# Patient Record
Sex: MALE | Race: WHITE | NOT HISPANIC OR LATINO | URBAN - METROPOLITAN AREA
[De-identification: names, ages, dates, MRNs, and addresses within clinical notes are randomized per-mention and may not be internally consistent; named-entity substitution may affect disease eponyms.]

---

## 2017-02-22 ENCOUNTER — OFFICE (OUTPATIENT)
Dept: URBAN - METROPOLITAN AREA CLINIC 78 | Facility: CLINIC | Age: 72
End: 2017-02-22

## 2017-02-22 VITALS
HEART RATE: 83 BPM | SYSTOLIC BLOOD PRESSURE: 156 MMHG | DIASTOLIC BLOOD PRESSURE: 100 MMHG | HEIGHT: 66 IN | TEMPERATURE: 98 F | WEIGHT: 179 LBS

## 2017-02-22 DIAGNOSIS — K52.89 OTHER SPECIFIED NONINFECTIVE GASTROENTERITIS AND COLITIS: ICD-10-CM

## 2017-02-22 DIAGNOSIS — Z79.899 OTHER LONG TERM (CURRENT) DRUG THERAPY: ICD-10-CM

## 2017-02-22 PROCEDURE — 99213 OFFICE O/P EST LOW 20 MIN: CPT

## 2017-02-22 RX ORDER — MESALAMINE 800 MG/1
TABLET, DELAYED RELEASE ORAL
Qty: 360 | Refills: 3 | Status: COMPLETED
Start: 2016-01-08 | End: 2018-03-28

## 2017-02-22 NOTE — SERVICEHPINOTES
70 yo white male presents for f/u colitis and refill of his mesalamine - taking Asacol  mg 2 PO BID. Patient has h/o indeterminate colitis diagnosed in 2000. States he originally had diarrhea issues but no blood in stools. Colonoscopy in 2000 showed erosions throughout but worse in right colon. He was put on sulfasalazine and switched at some point to Asacol which he has done very well with. Colonoscopy in 2008 showed appearance of colitis in the descending colon but all biopsies were wnl. Last colonoscopy with benign findings in Dec 2012 and 5-year recall advised. No GI sx. Denies any abdominal pain, diarrhea, weight loss, blood in stools. Has one normal formed BM per day.

## 2018-02-12 ENCOUNTER — INDEPENDENT LABORATORY (OUTPATIENT)
Dept: URBAN - METROPOLITAN AREA PATHOLOGY 17 | Facility: PATHOLOGY | Age: 73
End: 2018-02-12

## 2018-02-12 ENCOUNTER — OFFICE (OUTPATIENT)
Dept: URBAN - METROPOLITAN AREA CLINIC 32 | Facility: CLINIC | Age: 73
End: 2018-02-12
Payer: COMMERCIAL

## 2018-02-12 VITALS
DIASTOLIC BLOOD PRESSURE: 64 MMHG | DIASTOLIC BLOOD PRESSURE: 63 MMHG | TEMPERATURE: 97.7 F | HEIGHT: 66 IN | RESPIRATION RATE: 12 BRPM | HEART RATE: 65 BPM | SYSTOLIC BLOOD PRESSURE: 108 MMHG | OXYGEN SATURATION: 93 % | OXYGEN SATURATION: 96 % | DIASTOLIC BLOOD PRESSURE: 66 MMHG | DIASTOLIC BLOOD PRESSURE: 83 MMHG | OXYGEN SATURATION: 97 % | HEART RATE: 58 BPM | SYSTOLIC BLOOD PRESSURE: 92 MMHG | OXYGEN SATURATION: 98 % | WEIGHT: 179 LBS | RESPIRATION RATE: 14 BRPM | DIASTOLIC BLOOD PRESSURE: 60 MMHG | HEART RATE: 60 BPM | SYSTOLIC BLOOD PRESSURE: 101 MMHG | HEART RATE: 62 BPM | SYSTOLIC BLOOD PRESSURE: 100 MMHG | SYSTOLIC BLOOD PRESSURE: 111 MMHG | OXYGEN SATURATION: 92 % | DIASTOLIC BLOOD PRESSURE: 74 MMHG | RESPIRATION RATE: 10 BRPM | SYSTOLIC BLOOD PRESSURE: 102 MMHG | DIASTOLIC BLOOD PRESSURE: 65 MMHG | SYSTOLIC BLOOD PRESSURE: 130 MMHG | TEMPERATURE: 97.2 F | SYSTOLIC BLOOD PRESSURE: 128 MMHG | DIASTOLIC BLOOD PRESSURE: 59 MMHG | HEART RATE: 76 BPM | HEART RATE: 73 BPM | HEART RATE: 63 BPM | RESPIRATION RATE: 16 BRPM

## 2018-02-12 DIAGNOSIS — K52.89 OTHER SPECIFIED NONINFECTIVE GASTROENTERITIS AND COLITIS: ICD-10-CM

## 2018-02-12 DIAGNOSIS — Z86.010 PERSONAL HISTORY OF COLONIC POLYPS: ICD-10-CM

## 2018-02-12 DIAGNOSIS — K51.818 OTHER ULCERATIVE COLITIS WITH OTHER COMPLICATION: ICD-10-CM

## 2018-02-12 PROBLEM — K64.4 RESIDUAL HEMORRHOIDAL SKIN TAGS: Status: ACTIVE | Noted: 2018-02-12

## 2018-02-12 PROBLEM — K64.8 OTHER HEMORRHOIDS: Status: ACTIVE | Noted: 2018-02-12

## 2018-02-12 PROCEDURE — 88305 TISSUE EXAM BY PATHOLOGIST: CPT

## 2018-02-12 RX ADMIN — LIDOCAINE HYDROCHLORIDE 60 MG: 20 INJECTION, SOLUTION INFILTRATION; PERINEURAL at 08:12

## 2018-03-28 ENCOUNTER — OFFICE (OUTPATIENT)
Dept: URBAN - METROPOLITAN AREA CLINIC 78 | Facility: CLINIC | Age: 73
End: 2018-03-28

## 2018-03-28 VITALS
TEMPERATURE: 97.6 F | WEIGHT: 178 LBS | HEART RATE: 70 BPM | SYSTOLIC BLOOD PRESSURE: 117 MMHG | HEIGHT: 65 IN | DIASTOLIC BLOOD PRESSURE: 81 MMHG

## 2018-03-28 DIAGNOSIS — Z79.899 OTHER LONG TERM (CURRENT) DRUG THERAPY: ICD-10-CM

## 2018-03-28 DIAGNOSIS — K52.89 OTHER SPECIFIED NONINFECTIVE GASTROENTERITIS AND COLITIS: ICD-10-CM

## 2018-03-28 PROCEDURE — 99214 OFFICE O/P EST MOD 30 MIN: CPT

## 2018-03-28 NOTE — INTERFACERESULTNOTES
missing TPMT enzyme level - still need that; other labs ok other than stool test showing some inflammation in colon.

## 2018-03-28 NOTE — SERVICEHPINOTES
73 yo white male presents for f/u colitis see clinical hx below. He continues to feel well on Asacol  mg 2 PO BID. His recent colonoscopy was normal except for patchy colitis in cecum and ascending colon. Pan-biopsies done -all ok except for severe colitis findings in cecum and ascending colon. He was to have gotten additional labs done prior to today's visit but states he didn't get the orders in the mail. He currently has one BM daily - stools start formed but can become loose. Denies blood in stools. No abdominal pain, fevers, weight loss, joint pains. Clinical hx: Patient has h/o indeterminate colitis diagnosed in 2000. States he originally had diarrhea issues but no blood in stools. Colonoscopy in 2000 showed erosions throughout but worse in right colon. He was put on sulfasalazine and switched at some point to Asacol which he has done very well with. Colonoscopy in 2008 showed appearance of colitis in the descending colon but all biopsies were wnl. In Dec 2012 his colonoscopy showed benign findings with 5-year recall advised.

## 2018-03-30 LAB
AMBIG ABBREV CMP14 DEFAULT: (no result)
C-REACTIVE PROTEIN, QUANT: 1.9 MG/L (ref 0–4.9)
CBC WITH DIFFERENTIAL/PLATELET: BASO (ABSOLUTE): 0 X10E3/UL (ref 0–0.2)
CBC WITH DIFFERENTIAL/PLATELET: BASOS: 1 %
CBC WITH DIFFERENTIAL/PLATELET: EOS (ABSOLUTE): 0.2 X10E3/UL (ref 0–0.4)
CBC WITH DIFFERENTIAL/PLATELET: EOS: 4 %
CBC WITH DIFFERENTIAL/PLATELET: HEMATOCRIT: 44.4 % (ref 37.5–51)
CBC WITH DIFFERENTIAL/PLATELET: HEMATOLOGY COMMENTS: (no result)
CBC WITH DIFFERENTIAL/PLATELET: HEMOGLOBIN: 14.6 G/DL (ref 13–17.7)
CBC WITH DIFFERENTIAL/PLATELET: IMMATURE CELLS: (no result)
CBC WITH DIFFERENTIAL/PLATELET: IMMATURE GRANS (ABS): 0 X10E3/UL (ref 0–0.1)
CBC WITH DIFFERENTIAL/PLATELET: IMMATURE GRANULOCYTES: 0 %
CBC WITH DIFFERENTIAL/PLATELET: LYMPHS (ABSOLUTE): 2 X10E3/UL (ref 0.7–3.1)
CBC WITH DIFFERENTIAL/PLATELET: LYMPHS: 32 %
CBC WITH DIFFERENTIAL/PLATELET: MCH: 30.7 PG (ref 26.6–33)
CBC WITH DIFFERENTIAL/PLATELET: MCHC: 32.9 G/DL (ref 31.5–35.7)
CBC WITH DIFFERENTIAL/PLATELET: MCV: 93 FL (ref 79–97)
CBC WITH DIFFERENTIAL/PLATELET: MONOCYTES(ABSOLUTE): 0.7 X10E3/UL (ref 0.1–0.9)
CBC WITH DIFFERENTIAL/PLATELET: MONOCYTES: 11 %
CBC WITH DIFFERENTIAL/PLATELET: NEUTROPHILS (ABSOLUTE): 3.3 X10E3/UL (ref 1.4–7)
CBC WITH DIFFERENTIAL/PLATELET: NEUTROPHILS: 52 %
CBC WITH DIFFERENTIAL/PLATELET: NRBC: (no result)
CBC WITH DIFFERENTIAL/PLATELET: PLATELETS: 238 X10E3/UL (ref 150–379)
CBC WITH DIFFERENTIAL/PLATELET: RBC: 4.76 X10E6/UL (ref 4.14–5.8)
CBC WITH DIFFERENTIAL/PLATELET: RDW: 13.7 % (ref 12.3–15.4)
CBC WITH DIFFERENTIAL/PLATELET: WBC: 6.3 X10E3/UL (ref 3.4–10.8)
COMP. METABOLIC PANEL (14): A/G RATIO: 2 (ref 1.2–2.2)
COMP. METABOLIC PANEL (14): ALBUMIN: 4.3 G/DL (ref 3.5–4.8)
COMP. METABOLIC PANEL (14): ALKALINE PHOSPHATASE: 63 IU/L (ref 39–117)
COMP. METABOLIC PANEL (14): ALT (SGPT): 31 IU/L (ref 0–44)
COMP. METABOLIC PANEL (14): AST (SGOT): 26 IU/L (ref 0–40)
COMP. METABOLIC PANEL (14): BILIRUBIN, TOTAL: 0.4 MG/DL (ref 0–1.2)
COMP. METABOLIC PANEL (14): BUN/CREATININE RATIO: 20 (ref 10–24)
COMP. METABOLIC PANEL (14): BUN: 17 MG/DL (ref 8–27)
COMP. METABOLIC PANEL (14): CALCIUM: 9.6 MG/DL (ref 8.6–10.2)
COMP. METABOLIC PANEL (14): CARBON DIOXIDE, TOTAL: 25 MMOL/L (ref 18–29)
COMP. METABOLIC PANEL (14): CHLORIDE: 104 MMOL/L (ref 96–106)
COMP. METABOLIC PANEL (14): CREATININE: 0.87 MG/DL (ref 0.76–1.27)
COMP. METABOLIC PANEL (14): EGFR IF AFRICN AM: 100 ML/MIN/1.73 (ref 59–?)
COMP. METABOLIC PANEL (14): EGFR IF NONAFRICN AM: 87 ML/MIN/1.73 (ref 59–?)
COMP. METABOLIC PANEL (14): GLOBULIN, TOTAL: 2.2 G/DL (ref 1.5–4.5)
COMP. METABOLIC PANEL (14): GLUCOSE: 92 MG/DL (ref 65–99)
COMP. METABOLIC PANEL (14): POTASSIUM: 4.7 MMOL/L (ref 3.5–5.2)
COMP. METABOLIC PANEL (14): PROTEIN, TOTAL: 6.5 G/DL (ref 6–8.5)
COMP. METABOLIC PANEL (14): SODIUM: 143 MMOL/L (ref 134–144)
FERRITIN, SERUM: 259 NG/ML (ref 30–400)
HBSAG SCREEN: NEGATIVE
HCV ANTIBODY: HEP C VIRUS AB: <0.1 S/CO RATIO
HEP A AB, TOTAL: POSITIVE
HEP B CORE AB, TOT: NEGATIVE
HEPATITIS B SURF AB QUANT: <3.1 MIU/ML — LOW
IRON AND TIBC: IRON BIND.CAP.(TIBC): 281 UG/DL (ref 250–450)
IRON AND TIBC: IRON SATURATION: 38 % (ref 15–55)
IRON AND TIBC: IRON: 107 UG/DL (ref 38–169)
IRON AND TIBC: UIBC: 174 UG/DL (ref 111–343)
QUANTIFERON IN TUBE: INTERPRETATION: (no result)
QUANTIFERON IN TUBE: QFT TB AG MINUS NIL VALUE: 0 IU/ML
QUANTIFERON IN TUBE: QUANTIFERON CRITERIA: (no result)
QUANTIFERON IN TUBE: QUANTIFERON MITOGEN VALUE: 6.37 IU/ML
QUANTIFERON IN TUBE: QUANTIFERON NIL VALUE: 0.04 IU/ML
QUANTIFERON IN TUBE: QUANTIFERON TB AG VALUE: 0.04 IU/ML
QUANTIFERON IN TUBE: QUANTIFERON TB GOLD: NEGATIVE
QUANTIFERON TB GOLD (IN TUBE): QUANTIFERON INCUBATION: (no result)
SEDIMENTATION RATE-WESTERGREN: 2 MM/HR (ref 0–30)
VITAMIN B12: 857 PG/ML (ref 232–1245)
VITAMIN D, 25-HYDROXY: 45.9 NG/ML (ref 30–100)

## 2018-03-31 LAB — CALPROTECTIN, FECAL: 175 UG/G — HIGH (ref 0–120)

## 2018-04-04 LAB
TPMT GENETIC TEST: BACKGROUND: (no result)
TPMT GENETIC TEST: TPMT COMMENT: (no result)
TPMT GENETIC TEST: TPMT GENOTYPE: (no result)
TPMT GENETIC TEST: TPMT INTERPRETATION: (no result)
WRITTEN AUTHORIZATION: (no result)

## 2018-05-02 ENCOUNTER — OFFICE (OUTPATIENT)
Dept: URBAN - METROPOLITAN AREA CLINIC 78 | Facility: CLINIC | Age: 73
End: 2018-05-02

## 2018-05-02 VITALS
WEIGHT: 175 LBS | TEMPERATURE: 97.8 F | HEART RATE: 85 BPM | DIASTOLIC BLOOD PRESSURE: 73 MMHG | SYSTOLIC BLOOD PRESSURE: 129 MMHG | HEIGHT: 65 IN

## 2018-05-02 DIAGNOSIS — Z79.899 OTHER LONG TERM (CURRENT) DRUG THERAPY: ICD-10-CM

## 2018-05-02 DIAGNOSIS — K52.89 OTHER SPECIFIED NONINFECTIVE GASTROENTERITIS AND COLITIS: ICD-10-CM

## 2018-05-02 PROCEDURE — 99214 OFFICE O/P EST MOD 30 MIN: CPT

## 2018-05-02 RX ORDER — MESALAMINE 800 MG/1
TABLET, DELAYED RELEASE ORAL
Qty: 3 | Refills: 4 | Status: COMPLETED
End: 2020-05-12

## 2018-05-02 NOTE — SERVICEHPINOTES
73 yo white male presents for f/u colitis see clinical hx below. He continues to feel well on Asacol  mg 2 PO BID. His recent colonoscopy was normal except for patchy colitis in cecum and ascending colon. Pan-biopsies done -all ok except for severe colitis findings in cecum and ascending colon. He had f/u labs which were normal but fecal calpro elevated at 175. He currently has one BM daily - stools start formed but can become loose. Denies blood in stools. No abdominal pain, fevers, weight loss, joint pains. Clinical hx: Patient has h/o indeterminate colitis diagnosed in 2000. States he originally had diarrhea issues but no blood in stools. Colonoscopy in 2000 showed erosions throughout but worse in right colon. He was put on sulfasalazine and switched at some point to Asacol which he has done very well with. Colonoscopy in 2008 showed appearance of colitis in the descending colon but all biopsies were wnl. In Dec 2012 his colonoscopy showed benign findings with 5-year recall advised.3/30/18 WBC 6.3, iron sat 38, HBsAb neg, Vit D 45.9, Hep C ab neg, Quant TB neg, ESR 2, Vit B12 857, ferritin 259, HBsAg neg, CRP 1.9, HBcAb tot neg, Hep A ab pos/immune TPMT normal genotype fecal calpro 175

## 2018-10-12 ENCOUNTER — OFFICE (OUTPATIENT)
Dept: URBAN - METROPOLITAN AREA CLINIC 78 | Facility: CLINIC | Age: 73
End: 2018-10-12

## 2018-10-12 VITALS
DIASTOLIC BLOOD PRESSURE: 84 MMHG | WEIGHT: 177 LBS | HEIGHT: 65 IN | SYSTOLIC BLOOD PRESSURE: 137 MMHG | TEMPERATURE: 97.5 F | HEART RATE: 69 BPM

## 2018-10-12 DIAGNOSIS — K52.89 OTHER SPECIFIED NONINFECTIVE GASTROENTERITIS AND COLITIS: ICD-10-CM

## 2018-10-12 DIAGNOSIS — Z79.899 OTHER LONG TERM (CURRENT) DRUG THERAPY: ICD-10-CM

## 2018-10-12 PROCEDURE — 99213 OFFICE O/P EST LOW 20 MIN: CPT

## 2018-10-12 NOTE — SERVICEHPINOTES
73 yo white male presents for f/u colitis see clinical hx below. He had previously been on Asacol  mg 2 PO BID. Colonoscopy earlier this year was normal except for patchy colitis in cecum and ascending colon. Pan-biopsies done -all ok except for severe colitis findings in cecum and ascending colon. He had f/u labs which were normal but fecal calpro elevated at 175. In May he increased Asacol HD to 3 PO BID and also started turmeric and VSL #3 and l-glutamine. Has been doing well. He had a f/u fecal calpro level done recently and it improved to 93. Has a daily BM. Denies blood in stools. No abdominal pain, fevers, weight loss, joint pains. Clinical hx: Patient has h/o indeterminate colitis diagnosed in 2000. States he originally had diarrhea issues but no blood in stools. Colonoscopy in 2000 showed erosions throughout but worse in right colon. He was put on sulfasalazine and switched at some point to Asacol which he has done very well with. Colonoscopy in 2008 showed appearance of colitis in the descending colon but all biopsies were wnl. In Dec 2012 his colonoscopy showed benign findings with 5-year recall advised.8/27/18 creat 0.87, AST/ALT 19/20, fecal calpro 93BR3/30/18 WBC 6.3, iron sat 38, HBsAb neg, Vit D 45.9, Hep C ab neg, Quant TB neg, ESR 2, Vit B12 857, ferritin 259, HBsAg neg, CRP 1.9, HBcAb tot neg, Hep A ab pos/immune TPMT normal genotype fecal calpro 175

## 2019-02-27 ENCOUNTER — OFFICE (OUTPATIENT)
Dept: URBAN - METROPOLITAN AREA CLINIC 78 | Facility: CLINIC | Age: 74
End: 2019-02-27

## 2019-02-27 VITALS
WEIGHT: 178 LBS | DIASTOLIC BLOOD PRESSURE: 84 MMHG | TEMPERATURE: 97.4 F | HEIGHT: 65 IN | SYSTOLIC BLOOD PRESSURE: 144 MMHG | HEART RATE: 75 BPM

## 2019-02-27 DIAGNOSIS — K52.89 OTHER SPECIFIED NONINFECTIVE GASTROENTERITIS AND COLITIS: ICD-10-CM

## 2019-02-27 DIAGNOSIS — Z79.899 OTHER LONG TERM (CURRENT) DRUG THERAPY: ICD-10-CM

## 2019-02-27 PROCEDURE — 99213 OFFICE O/P EST LOW 20 MIN: CPT

## 2019-02-27 NOTE — SERVICEHPINOTES
72 yo white male presents for f/u colitis see clinical hx below. He had previously been on Asacol  mg 2 PO BID. Colonoscopy earlier 2018 was normal except for patchy colitis in cecum and ascending colon. Pan-biopsies done -all ok except for severe colitis findings in cecum and ascending colon. He had f/u labs which were normal but fecal calpro elevated at 175. In May he increased Asacol HD to 3 PO BID and also started turmeric and probiotics and l-glutamine. He had a f/u fecal calpro level done in August - improved at 93. He continued his regimen and his latest fecal calpro was &lt16. He continues to feel well from GI perspective. He has a daily BM. Denies blood in stools. No abdominal pain, fevers, weight loss, joint pains. Clinical hx: Patient has h/o indeterminate colitis diagnosed in 2000. States he originally had diarrhea issues but no blood in stools. Colonoscopy in 2000 showed erosions throughout but worse in right colon. He was put on sulfasalazine and switched at some point to Asacol which he has done very well with. Colonoscopy in 2008 showed appearance of colitis in the descending colon but all biopsies were wnl. In Dec 2012 his colonoscopy showed benign findings with 5-year recall advised.1/25/19 CMP unremarkable (non-fasting), fecal calpro &xk94IF6/27/18 creat 0.87, AST/ALT 19/20, fecal calpro 93BR3/30/18 WBC 6.3, iron sat 38, HBsAb neg, Vit D 45.9, Hep C ab neg, Quant TB neg, ESR 2, Vit B12 857, ferritin 259, HBsAg neg, CRP 1.9, HBcAb tot neg, Hep A ab pos/immune TPMT normal genotype fecal calpro 175

## 2020-05-12 ENCOUNTER — TELEHEALTH PROVIDED OTHER THAN IN PATIENT'S HOME (OUTPATIENT)
Dept: URBAN - METROPOLITAN AREA TELEHEALTH 7 | Facility: TELEHEALTH | Age: 75
End: 2020-05-12

## 2020-05-12 VITALS — WEIGHT: 162 LBS | HEIGHT: 65 IN

## 2020-05-12 DIAGNOSIS — K52.89 OTHER SPECIFIED NONINFECTIVE GASTROENTERITIS AND COLITIS: ICD-10-CM

## 2020-05-12 DIAGNOSIS — Z79.899 OTHER LONG TERM (CURRENT) DRUG THERAPY: ICD-10-CM

## 2020-05-12 PROCEDURE — 99214 OFFICE O/P EST MOD 30 MIN: CPT | Mod: 95 | Performed by: PHYSICIAN ASSISTANT

## 2020-05-12 RX ORDER — MESALAMINE 800 MG/1
TABLET, DELAYED RELEASE ORAL
Qty: 360 | Refills: 3 | Status: ACTIVE

## 2020-05-12 NOTE — SERVICEHPINOTES
PATIENT VERIFIED BY DATE OF BIRTH AND NAME. Patient has been consented for this telecommunication visit. 75 yo white male presents for f/u colitis see clinical hx below. He continues to feel well from a GI perspective. He is on mesalamine 800 mg 3 pills 2x/day along with ongoing probiotics, turmeric, and l-glutamine. He has intentionally lost a little weight recently. He has a daily BM. Denies blood in stools. No abdominal pain, fevers, weight loss, joint pains, rashes. Latest fecal calpro was done in January of this year and was still negative. ROS today is negative other than some sleep cycle change and depression which he states is related to his wife passing away in March. He will likely have his physical and routine blood work with his PCP in a few months. He otherwise had a CBC and CMP for us in January - unremarkable.Clinical hx: Patient has h/o indeterminate colitis diagnosed in 2000. States he originally had diarrhea issues but no blood in stools. Colonoscopy in 2000 showed erosions throughout but worse in right colon. He was put on sulfasalazine and switched at some point to Asacol which he has done very well with. Colonoscopy in 2008 showed appearance of colitis in the descending colon but all biopsies were wnl. In Dec 2012 his colonoscopy showed benign findings with 5-year recall advised. His last colonoscopy was in February of 2018, at which time he was on Asacol  mg 2 PO BID. Colonoscopy was normal except for patchy colitis in cecum and ascending colon. Pan-biopsies done -all ok except for severe colitis findings in cecum and ascending colon. He had f/u labs which were normal but fecal calpro was elevated at 175. In May of 2018, he increased Asacol HD to 3 PO BID and also started turmeric and probiotics and l-glutamine. He had a f/u fecal calpro level done in August - improved at 93. He continued his regimen and had another fecal calpro in January 2019 which showed &lt16. He has continued to do well. 1/15/20 fecal calpro &lt15.6 BR1/25/19 CMP unremarkable (non-fasting), fecal calpro &hd70RC2/27/18 creat 0.87, AST/ALT 19/20, fecal calpro 93BR3/30/18 WBC 6.3, iron sat 38, HBsAb neg, Vit D 45.9, Hep C ab neg, Quant TB neg, ESR 2, Vit B12 857, ferritin 259, HBsAg neg, CRP 1.9, HBcAb tot neg, Hep A ab pos/immune TPMT normal genotype fecal calpro 175

## 2021-12-09 ENCOUNTER — TELEHEALTH PROVIDED OTHER THAN IN PATIENT'S HOME (OUTPATIENT)
Dept: URBAN - METROPOLITAN AREA TELEHEALTH 3 | Facility: TELEHEALTH | Age: 76
End: 2021-12-09

## 2021-12-09 VITALS — HEIGHT: 65 IN | WEIGHT: 162 LBS

## 2021-12-09 DIAGNOSIS — K52.9 NONINFECTIVE GASTROENTERITIS AND COLITIS, UNSPECIFIED: ICD-10-CM

## 2021-12-09 DIAGNOSIS — Z79.899 OTHER LONG TERM (CURRENT) DRUG THERAPY: ICD-10-CM

## 2021-12-09 PROCEDURE — 99214 OFFICE O/P EST MOD 30 MIN: CPT | Mod: 95 | Performed by: PHYSICIAN ASSISTANT

## 2021-12-09 RX ORDER — MESALAMINE 800 MG/1
TABLET, DELAYED RELEASE ORAL
Qty: 3 | Refills: 4 | Status: COMPLETED
End: 2024-03-19

## 2021-12-09 NOTE — SERVICEHPINOTES
PATIENT VERIFIED BY DATE OF BIRTH AND NAME. Patient has been consented for this telecommunication visit.
br
br75 yo white male presents for f/u colitis see clinical hx below. He continues to feel well from a GI perspective. He is on mesalamine 800 mg 3 pills 2x/day along with ongoing probiotics, turmeric, and l-glutamine. He has a daily BM. Denies blood in stools. No abdominal pain, fevers, weight loss, joint pains (other than knee - planning on surgery), rashes. Latest fecal calpro was done in January of this year and was 72. 
br
br He is due for colonoscopy but says he is planning to have knee replacement in January and wants to recover from that before having his colonoscopy. 
br
br No recent labs.ROS as above, otherwise negative.Clinical hx: Patient has h/o indeterminate colitis diagnosed in 2000. States he originally had diarrhea issues but no blood in stools. Colonoscopy in 2000 showed erosions throughout but worse in right colon. He was put on sulfasalazine and switched at some point to Asacol which he has done very well with. Colonoscopy in 2008 showed appearance of colitis in the descending colon but all biopsies were wnl. In Dec 2012 his colonoscopy showed benign findings with 5-year recall advised.His last colonoscopy was in February of 2018, at which time he was on Asacol  mg 2 PO BID. Colonoscopy was normal except for patchy colitis in cecum and ascending colon. Pan-biopsies done -all ok except for severe colitis findings in cecum and ascending colon. He had f/u labs which were normal but fecal calpro was elevated at 175. In May of 2018, he increased Asacol HD to 3 PO BID and also started turmeric and probiotics and l-glutamine. He had a f/u fecal calpro level done in August - improved at 93. He continued his regimen and had another fecal calpro in January 2019 which showed &lt16. He has continued to do well symptomatically. brooke cox 1/13/21 fecal calpro 72br1/15/20 fecal calpro &lt15.6br1/25/19 CMP unremarkable (non-fasting), fecal calpro &ap29cu9/27/18 creat 0.87, AST/ALT 19/20, fecal calpro 93br3/30/18 WBC 6.3, iron sat 38, HBsAb neg, Vit D 45.9, Hep C ab neg, Quant TB neg, ESR 2, Vit B12 857, ferritin 259, HBsAg neg, CRP 1.9, HBcAb tot neg, Hep A ab pos/immune TPMT normal genotype fecal calpro 175

## 2023-02-26 PROBLEM — Z80.0 COLON CA, FAMILY HISTORY: Status: ACTIVE | Noted: 2023-02-22

## 2023-03-20 ENCOUNTER — OFFICE (OUTPATIENT)
Dept: URBAN - METROPOLITAN AREA CLINIC 79 | Facility: CLINIC | Age: 78
End: 2023-03-20

## 2023-03-20 PROCEDURE — 00031: CPT | Performed by: INTERNAL MEDICINE

## 2023-04-20 ENCOUNTER — AMBULATORY SURGICAL CENTER (OUTPATIENT)
Dept: URBAN - METROPOLITAN AREA SURGERY 23 | Facility: SURGERY | Age: 78
End: 2023-04-20
Payer: COMMERCIAL

## 2023-04-20 DIAGNOSIS — K51.90 ULCERATIVE COLITIS, UNSPECIFIED, WITHOUT COMPLICATIONS: ICD-10-CM

## 2023-04-20 PROCEDURE — 45380 COLONOSCOPY AND BIOPSY: CPT | Performed by: INTERNAL MEDICINE

## 2024-03-19 ENCOUNTER — TELEHEALTH PROVIDED OTHER THAN IN PATIENT'S HOME (OUTPATIENT)
Dept: URBAN - METROPOLITAN AREA TELEHEALTH 7 | Facility: TELEHEALTH | Age: 79
End: 2024-03-19

## 2024-03-19 VITALS — WEIGHT: 170 LBS | HEIGHT: 65 IN

## 2024-03-19 DIAGNOSIS — K52.9 NONINFECTIVE GASTROENTERITIS AND COLITIS, UNSPECIFIED: ICD-10-CM

## 2024-03-19 DIAGNOSIS — Z79.899 OTHER LONG TERM (CURRENT) DRUG THERAPY: ICD-10-CM

## 2024-03-19 PROCEDURE — 99213 OFFICE O/P EST LOW 20 MIN: CPT | Mod: 95 | Performed by: PHYSICIAN ASSISTANT

## 2024-03-19 RX ORDER — MESALAMINE 800 MG/1
TABLET, DELAYED RELEASE ORAL
Qty: 540 | Refills: 3 | Status: ACTIVE
Start: 2024-03-04

## 2024-03-19 NOTE — SERVICEHPINOTES
PATIENT VERIFIED BY DATE OF BIRTH AND NAME. Patient has been consented for this telecommunication visit.
brooke
br78 yo white male presents for f/u colitis see clinical hx below. He continues to feel well from a GI perspective. He is on mesalamine 800 mg 3 pills 2x/day along with ongoing probiotics, turmeric, and l-glutamine. He reports regular daily BMs and stools are normal no blood. No GI concerns. He denies eye symptoms. No rashes except for mild psoriasis. Can have soreness in lower back. No other significant joint issues - has had knee surgery and is doing well. Has upcoming carpal tunnel surgery and laser kidney stone removal. He reports labs with PCP.  No other concerns today. 
brooke cox Last colonoscopy without active colitis in April 2023 and 3-year recall advised.
brooke coxROS as above, otherwise negative.Clinical hx: Patient has h/o indeterminate colitis diagnosed in 2000. States he originally had diarrhea issues but no blood in stools. Colonoscopy in 2000 showed erosions throughout but worse in right colon. He was put on sulfasalazine and switched at some point to Asacol which he has done very well with. Colonoscopy in 2008 showed appearance of colitis in the descending colon but all biopsies were wnl. In Dec 2012 his colonoscopy showed benign findings with 5-year recall advised.Colonoscopy done February 2018, at which time he was on Asacol  mg 2 PO BID. Colonoscopy was normal except for patchy colitis in cecum and ascending colon. Pan-biopsies done -all ok except for severe colitis findings in cecum and ascending colon. He had f/u labs which were normal but fecal calpro was elevated at 175. In May of 2018, he increased Asacol HD to 3 PO BID and also started turmeric and probiotics and l-glutamine. He had a f/u fecal calpro level done in August - improved at 93. He continued his regimen and had another fecal calpro in January 2019 which showed &lt16. He has continued to do well symptomatically.brooke

## 2024-03-19 NOTE — SERVICENOTES
Patient's visit was conducted through [employer] video telecommunication. Patient consented before the start of visit as to understanding of privacy concerns, possible technological failure, and their responsibility of carrying out instructions of plan.

## 2025-05-29 ENCOUNTER — TELEHEALTH PROVIDED IN PATIENT'S HOME (OUTPATIENT)
Dept: URBAN - METROPOLITAN AREA TELEHEALTH 7 | Facility: TELEHEALTH | Age: 80
End: 2025-05-29
Payer: COMMERCIAL

## 2025-05-29 VITALS — WEIGHT: 170 LBS | HEIGHT: 65 IN

## 2025-05-29 DIAGNOSIS — K52.9 NONINFECTIVE GASTROENTERITIS AND COLITIS, UNSPECIFIED: ICD-10-CM

## 2025-05-29 DIAGNOSIS — Z79.899 OTHER LONG TERM (CURRENT) DRUG THERAPY: ICD-10-CM

## 2025-05-29 PROCEDURE — 99213 OFFICE O/P EST LOW 20 MIN: CPT | Mod: 95 | Performed by: PHYSICIAN ASSISTANT

## 2025-05-29 RX ORDER — MESALAMINE 800 MG/1
TABLET, DELAYED RELEASE ORAL
Qty: 540 | Refills: 3 | Status: ACTIVE
Start: 2024-03-04

## 2025-05-29 NOTE — SERVICENOTES
Patient was located in their home during visit., Patient's visit was conducted through [employer] video telecommunication. Patient consented before the start of visit as to understanding of privacy concerns, possible technological failure, and their responsibility of carrying out instructions of plan., I spent 15 minutes today reviewing the chart, talking with the patient, reviewing previous results with patient, discussing plan, and documenting. Patient understands and agrees with plan. Questions/concerns addressed.

## 2025-05-29 NOTE — SERVICEHPINOTES
PATIENT VERIFIED BY DATE OF BIRTH AND NAME. Patient has been consented for this telecommunication visit using [employer] application.
br
br79 yo white male presents for f/u colitis see clinical hx below. He continues to feel well from a GI perspective. He is on mesalamine 800 mg 3 pills 2x/day along with ongoing probiotics, turmeric, and l-glutamine. He reports regular daily BMs and stools are normal no blood. No GI concerns. He denies eye symptoms. No rashes except for mild psoriasis issue just on his ring finger. Denies any new joint issues. 
br brooke He reports labs relatively recently with PCP. Last colonoscopy without active colitis in April 2023 and 3-year recall advised.No other concerns today. ROS as above, otherwise negative.Clinical hx: Patient has h/o indeterminate colitis diagnosed in 2000. States he originally had diarrhea issues but no blood in stools. Colonoscopy in 2000 showed erosions throughout but worse in right colon. He was put on sulfasalazine and switched at some point to Asacol which he has done very well with. Colonoscopy in 2008 showed appearance of colitis in the descending colon but all biopsies were wnl. In Dec 2012 his colonoscopy showed benign findings with 5-year recall advised.Colonoscopy done February 2018, at which time he was on Asacol  mg 2 PO BID. Colonoscopy was normal except for patchy colitis in cecum and ascending colon. Pan-biopsies done -all ok except for severe colitis findings in cecum and ascending colon. He had f/u labs which were normal but fecal calpro was elevated at 175. In May of 2018, he increased Asacol HD to 3 PO BID and also started turmeric and probiotics and l-glutamine. He had a f/u fecal calpro level done in August - improved at 93. He continued his regimen and had another fecal calpro in January 2019 which showed &lt16. He has continued to do well symptomatically.brooke